# Patient Record
Sex: FEMALE | Race: BLACK OR AFRICAN AMERICAN | NOT HISPANIC OR LATINO | Employment: FULL TIME | URBAN - METROPOLITAN AREA
[De-identification: names, ages, dates, MRNs, and addresses within clinical notes are randomized per-mention and may not be internally consistent; named-entity substitution may affect disease eponyms.]

---

## 2023-07-09 ENCOUNTER — HOSPITAL ENCOUNTER (EMERGENCY)
Facility: HOSPITAL | Age: 53
Discharge: HOME | End: 2023-07-09
Attending: EMERGENCY MEDICINE
Payer: COMMERCIAL

## 2023-07-09 VITALS
HEART RATE: 79 BPM | RESPIRATION RATE: 16 BRPM | WEIGHT: 201 LBS | TEMPERATURE: 97 F | SYSTOLIC BLOOD PRESSURE: 133 MMHG | OXYGEN SATURATION: 98 % | DIASTOLIC BLOOD PRESSURE: 90 MMHG | HEIGHT: 65 IN | BODY MASS INDEX: 33.49 KG/M2

## 2023-07-09 DIAGNOSIS — S70.01XA CONTUSION OF RIGHT HIP, INITIAL ENCOUNTER: ICD-10-CM
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
## 2023-07-09 DIAGNOSIS — S83.91XA SPRAIN OF RIGHT KNEE, UNSPECIFIED LIGAMENT, INITIAL ENCOUNTER: Primary | ICD-10-CM

## 2023-07-09 PROCEDURE — 25000003 PHARM REV CODE 250: Performed by: PHYSICIAN ASSISTANT

## 2023-07-09 PROCEDURE — 99284 EMERGENCY DEPT VISIT MOD MDM: CPT

## 2023-07-09 RX ORDER — HYDROCODONE BITARTRATE AND ACETAMINOPHEN 7.5; 325 MG/1; MG/1
1 TABLET ORAL
Status: COMPLETED | OUTPATIENT
Start: 2023-07-09 | End: 2023-07-09

## 2023-07-09 RX ORDER — HYDROCODONE BITARTRATE AND ACETAMINOPHEN 5; 325 MG/1; MG/1
1 TABLET ORAL EVERY 8 HOURS PRN
Qty: 12 TABLET | Refills: 0 | Status: SHIPPED | OUTPATIENT
Start: 2023-07-09 | End: 2023-07-25

## 2023-07-09 RX ADMIN — HYDROCODONE BITARTRATE AND ACETAMINOPHEN 1 TABLET: 7.5; 325 TABLET ORAL at 02:07

## 2023-07-09 NOTE — ED PROVIDER NOTES
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 fell on right side, after trying to get up felt right knee buckle. Denies blood thinner/hitting head/ LOC. Unable to walk due to pain in right knee. AAOx4, GCS15     52 y.o. female presents to the ED with right hip/knee/lower leg pain s/t fall onset 1 hour ago. States she was stepping down from the back of a [employer] (approx 1-2 foot off the ground) when she lost her footing and fell on her right side. Denies hitting her head or LOC, neck pain, chest pain, abdominal pain, SOB, bowel or bladder incontinence, saddle paresthesia, numbness, tingling, weakness. States she tried walking earlier however notes pain in the knee caused it to buckle and she has not tried walking since. Did not take any medications at home    The history is provided by the patient. No  was used.   Review of patient's allergies indicates:  No Known Allergies  No past medical history on file.  No past surgical history on file.  No family history on file.     Review of Systems   Constitutional:  Negative for chills and fever.   Eyes:  Negative for visual disturbance.   Respiratory:  Negative for cough and shortness of breath.    Cardiovascular:  Negative for chest pain.   Gastrointestinal:  Negative for abdominal pain, nausea and vomiting.   Genitourinary:  Negative for dysuria.   Musculoskeletal:  Positive for arthralgias and myalgias. Negative for back pain and neck pain.   Skin:  Negative for color change and rash.   Neurological:  Negative for dizziness and headaches.   Psychiatric/Behavioral:  Negative for behavioral problems.    All other systems reviewed and are negative.    Physical Exam     Initial Vitals [07/09/23 1327]   BP Pulse Resp Temp SpO2   (!) 146/101 78 18 97 °F (36.1 °C) 99 %      MAP       --          Physical Exam    Nursing note and vitals reviewed.  Constitutional: She appears well-developed and well-nourished.   HENT:   Head: Normocephalic and atraumatic.   Eyes: EOM are normal. Pupils are equal, round, and reactive to light.   Neck: Trachea normal. Neck supple.    Full passive range of motion without pain.     Cardiovascular:  Normal rate, regular rhythm, normal heart sounds and intact distal pulses.           Pulmonary/Chest: Breath sounds normal. She exhibits no tenderness.   No ecchymosis to chest wall   Abdominal: Abdomen is soft. Bowel sounds are normal. She exhibits no distension. There is no abdominal tenderness.   No ecchymosis to abdominal wall There is no rebound.   Musculoskeletal:      Right shoulder: Tenderness (Mild) present. No swelling, deformity, effusion, laceration, bony tenderness or crepitus. Decreased range of motion. Normal strength. Normal pulse.      Cervical back: Normal, full passive range of motion without pain and neck supple.      Thoracic back: Normal.      Lumbar back: Normal.      Right hip: Tenderness present. No deformity, lacerations, bony tenderness or crepitus. Normal range of motion. Normal strength.      Right knee: No swelling, deformity, effusion, erythema, ecchymosis, lacerations or bony tenderness. Decreased range of motion (due to pain). Tenderness present over the patellar tendon. Normal pulse.        Legs:      Neurological: She is alert and oriented to person, place, and time. She has normal strength. GCS score is 15. GCS eye subscore is 4. GCS verbal subscore is 5. GCS motor subscore is 6.   Skin: Skin is warm and dry. Capillary refill takes less than 2 seconds.   Psychiatric: She has a normal mood and affect.       ED Course   Procedures  Labs Reviewed - No data to display       Imaging Results              XR Ribs Min 3 Views w/PA Chest Right (Final result)  Result time 07/09/23 14:48:29      Final result by Butch Membreno MD (07/09/23 14:48:29)                    Impression:      No acute cardiopulmonary process.  Nose displaced rib fracture on the right.      Electronically signed by: Butch Membreno MD  Date:    07/09/2023  Time:    14:48               Narrative:    EXAMINATION:  XR RIBS MIN 3 VIEWS W/ PA CHEST RIGHT    CLINICAL HISTORY:  fall;    TECHNIQUE:  As above    COMPARISON:  None    FINDINGS:  Cardiomediastinal silhouette and pulmonary vasculature are normal.    The lungs and pleural spaces are clear.  No evidence for pneumothorax.    No displaced rib fractures on the right                                       X-Ray Tibia Fibula 2 View Right (Final result)  Result time 07/09/23 14:47:36      Final result by Butch Membreno MD (07/09/23 14:47:36)                   Impression:      No fracture of the tibia or fibula.      Electronically signed by: Butch Membreno MD  Date:    07/09/2023  Time:    14:47               Narrative:    EXAMINATION:  XR TIBIA FIBULA 2 VIEW RIGHT    CLINICAL HISTORY:  Unspecified fall, initial encounter    TECHNIQUE:  AP and lateral views of the right tibia and fibula were performed.    COMPARISON:  None.    FINDINGS:  No fracture of the tibia or fibula.  No significant soft tissue swelling or joint effusion                                       X-Ray Knee Complete 4 Or More Views Right (Final result)  Result time 07/09/23 14:47:11      Final result by Butch Membreno MD (07/09/23 14:47:11)                   Impression:      No fracture of the right knee with minimal degenerative changes.      Electronically signed by: Butch Membreno MD  Date:    07/09/2023  Time:    14:47               Narrative:    EXAMINATION:  XR KNEE COMP 4 OR MORE VIEWS RIGHT    CLINICAL HISTORY:  Unspecified fall, initial encounter    TECHNIQUE:  As above    COMPARISON:  None    FINDINGS:  No fracture.  The alignment is normal.  Joint space narrowing is identified greatest in the medial compartment.  Spurring the tibial spines identified.  No evidence for joint effusion or  soft tissue swelling.                                       X-Ray Hip 2 or 3 views Right (with Pelvis when performed) (Final result)  Result time 07/09/23 14:46:43      Final result by Butch Membreno MD (07/09/23 14:46:43)                   Impression:      No fracture the pelvis or proximal femora.  Degenerative changes are identified      Electronically signed by: Butch Membreno MD  Date:    07/09/2023  Time:    14:46               Narrative:    EXAMINATION:  XR HIP WITH PELVIS WHEN PERFORMED, 2 OR 3  VIEWS RIGHT    CLINICAL HISTORY:  fall;    TECHNIQUE:  AP view of the pelvis and frog leg lateral view of the right hip were performed.    COMPARISON:  None    FINDINGS:  No fracture the pelvis or proximal femora.  The femoral heads well as the acetabulum with joint space narrowing and marginal joint osteophytes.    The bilateral SI joints and pubic symphysis are normal.                                       Medications   HYDROcodone-acetaminophen 7.5-325 mg per tablet 1 tablet (1 tablet Oral Given 7/9/23 1411)     Medical Decision Making:   Initial Assessment:   52 y.o. female presents to the ED with right hip/knee/lower leg pain s/t fall onset 1 hour ago. States she was stepping down from the back of a [employer] (approx 1-2 foot off the ground) when she lost her footing and fell on her right side. Denies hitting her head or LOC, neck pain, chest pain, abdominal pain, SOB, bowel or bladder incontinence, saddle paresthesia, numbness, tingling, weakness. States she tried walking earlier however notes pain in the knee caused it to buckle and she has not tried walking since. Did not take any medications at home  Differential Diagnosis:   Fall, contusion, fracture, dislocation, sprain  Clinical Tests:   Radiological Study: Reviewed and Ordered           ED Course as of 07/09/23 1536   Sun Jul 09, 2023   1523 States she is feeling better after oral pain medication.  Discussed imaging studies with patient.  Will discharge patient  home with crutches and an Ace wrap to help with symptom relief as well as a short course of narcotic pain medication.  Patient states she moved here in April and has not established with a PCP at so will send referral for her.  Encouraged patient to follow up with the PCP once she was establish in order to rule out ligamentous or meniscal injury if the symptoms persist. [MA]      ED Course User Index  [MA] Larry Yeung PA-C                 Clinical Impression:   Final diagnoses:  [W19.XXXA] Fall  [S83.91XA] Sprain of right knee, unspecified ligament, initial encounter (Primary)  [S70.01XA] Contusion of right hip, initial encounter        ED Disposition Condition    Discharge Stable          ED Prescriptions       Medication Sig Dispense Start Date End Date Auth. Provider    HYDROcodone-acetaminophen (NORCO) 5-325 mg per tablet Take 1 tablet by mouth every 8 (eight) hours as needed for Pain. 12 tablet 7/9/2023 7/13/2023 Larry Yeung PA-C          Follow-up Information       Follow up With Specialties Details Why Contact Info    [facility] - Emergency Dept Emergency Medicine In 1 week If symptoms worsen [address] [place] [id number] [phone]    Primary Care Provider  Call  Call the number above and they will get you scheduled with a primary care provider within 48 hours for an appointment [phone]    [facility]   Referral has been sent on your behalf; they will call to schedule follow-up appointment [phone]             Larry Yeung PA-C  07/09/23 2236

## 2023-07-17 ENCOUNTER — OFFICE VISIT (OUTPATIENT)
Dept: ORTHOPEDICS | Facility: CLINIC | Age: 53
End: 2023-07-17
Payer: COMMERCIAL

## 2023-07-17 VITALS — BODY MASS INDEX: 33.49 KG/M2 | HEIGHT: 65 IN | WEIGHT: 201 LBS

## 2023-07-17 DIAGNOSIS — S83.91XA SPRAIN OF RIGHT KNEE, UNSPECIFIED LIGAMENT, INITIAL ENCOUNTER: ICD-10-CM

## 2023-07-17 PROCEDURE — 99204 PR OFFICE/OUTPT VISIT, NEW, LEVL IV, 45-59 MIN: ICD-10-PCS | Mod: ,,, | Performed by: REHABILITATION UNIT

## 2023-07-17 PROCEDURE — 1159F MED LIST DOCD IN RCRD: CPT | Mod: CPTII,,, | Performed by: REHABILITATION UNIT

## 2023-07-17 PROCEDURE — 1159F PR MEDICATION LIST DOCUMENTED IN MEDICAL RECORD: ICD-10-PCS | Mod: CPTII,,, | Performed by: REHABILITATION UNIT

## 2023-07-17 PROCEDURE — 99204 OFFICE O/P NEW MOD 45 MIN: CPT | Mod: ,,, | Performed by: REHABILITATION UNIT

## 2023-07-17 PROCEDURE — 3008F BODY MASS INDEX DOCD: CPT | Mod: CPTII,,, | Performed by: REHABILITATION UNIT

## 2023-07-17 PROCEDURE — 3008F PR BODY MASS INDEX (BMI) DOCUMENTED: ICD-10-PCS | Mod: CPTII,,, | Performed by: REHABILITATION UNIT

## 2023-07-17 RX ORDER — LEVETIRACETAM 500 MG/1
500 TABLET ORAL 2 TIMES DAILY
COMMUNITY
Start: 2023-07-09

## 2023-07-17 RX ORDER — HYDROCODONE BITARTRATE AND ACETAMINOPHEN 10; 325 MG/1; MG/1
1 TABLET ORAL
COMMUNITY
End: 2023-07-25

## 2023-07-17 RX ORDER — DICLOFENAC SODIUM 75 MG/1
75 TABLET, DELAYED RELEASE ORAL 2 TIMES DAILY
Qty: 28 TABLET | Refills: 0 | Status: SHIPPED | OUTPATIENT
Start: 2023-07-17 | End: 2023-07-31

## 2023-07-17 NOTE — PROGRESS NOTES
"Subjective:      Patient ID: Zandra Beck is a 52 y.o. female.    Chief Complaint: Knee Pain (pt fell off [employer] truck and injured right knee. DOI 7/9/23. went to ED after and has XR in chart. pt states the knee has been hurting since the fall. states the emergency room told her the knee may have torn ligament.)    HPI:   Zandra Beck is a 52 y.o. female who presents today for initial evaluation of right knee.  She was stepping out from a moving truck footing and fell landing on her right side.  The injury occurred on 07/09/2023.  She reports instability following the fall and inability to weightbear.  She went to the ED.  She had radiographs obtained with no acute bony abnormality.  She was referred for orthopedic evaluation.  She denies any sensory or motor changes distally.  She does have some instability reported about her knee.  No mechanical symptoms.  She has taken her prescription of Norco 10.  Ambulating without device.    History reviewed. No pertinent past medical history.  Past Surgical History:   Procedure Laterality Date    MYRINGOTOMY W/ TUBES       Social History     Socioeconomic History    Marital status: Single   Tobacco Use    Smoking status: Never    Smokeless tobacco: Never   Substance and Sexual Activity    Alcohol use: Yes     Comment: soc    Drug use: Never         Current Outpatient Medications:     HYDROcodone-acetaminophen (NORCO)  mg per tablet, Take 1 tablet by mouth. 12 total, Disp: , Rfl:     levETIRAcetam (KEPPRA) 500 MG Tab, Take 500 mg by mouth 2 (two) times daily., Disp: , Rfl:   Review of patient's allergies indicates:  No Known Allergies    Ht 5' 5" (1.651 m)   Wt 91.2 kg (201 lb)   BMI 33.45 kg/m²     Comprehensive review of systems completed and negative except as per HPI.        Objective:   Head: Normocephalic, without obvious abnormality, atraumatic  Eyes: conjunctivae/corneas clear. EOM's intact  Ears: normal external appearance  Nose: Nares normal. Septum " midline. Mucosa normal. No drainage  Throat: normal findings: lips normal without lesions  Lungs: unlabored breathing on room air  Chest wall: symmetric chest rise  Heart: regular rate and rhythm  Pulses: 2+ and symmetric  Skin: Skin color, texture, turgor normal. No rashes or lesions  Neurologic: Grossly normal    right KNEE:     Alignment: neutral  Appearance: skin in intact without lesion  Effusion:  Small  Gait antalgic  Straight Leg Raise: negative  Log Roll: negative  Hip ROM: full and painless  Ankle ROM: full and painless   Knee ROM:  3 - 95  Tenderness:  Diffuse TTP  with maximal tenderness to the medial joint line and some to the lateral joint line  Patellar Mobility: normal  Patellar grind: negative  PF Crepitus: negative        Ramiro Test: + to pain  Valgus Stress @ 0 deg: stable  Valgus Stress @ 30 deg: stable  Varus Stress @ 0 deg: stable  Varus Stress @ 30 deg: stable  Lachman:  Guarding  Ant Drawer: negative   Post Drawer: negative  Posterior Sag Sign: negative  Neurological deficits: SILT dp/sp/t distributions  Motor: 5/5 EHL/FHL/TA/GS    Warm well perfused lower extremity with capillary refill less than 2 seconds and sensation intact to light touch in the terminal nerve distributions. Calf soft and easily compressible without clinical sign of DVT. No palpable popliteal lymphadenopathy    Assessment:     Imaging:   Narrative & Impression  EXAMINATION:  XR KNEE COMP 4 OR MORE VIEWS RIGHT     CLINICAL HISTORY:  Unspecified fall, initial encounter     TECHNIQUE:  As above     COMPARISON:  None     FINDINGS:  No fracture.  The alignment is normal.  Joint space narrowing is identified greatest in the medial compartment.  Spurring the tibial spines identified.  No evidence for joint effusion or soft tissue swelling.     Impression:     No fracture of the right knee with minimal degenerative changes.        Electronically signed by: Butch Membreno MD  Date:                                             07/09/2023  Time:                                           14:47    Narrative & Impression  EXAMINATION:  XR TIBIA FIBULA 2 VIEW RIGHT     CLINICAL HISTORY:  Unspecified fall, initial encounter     TECHNIQUE:  AP and lateral views of the right tibia and fibula were performed.     COMPARISON:  None.     FINDINGS:  No fracture of the tibia or fibula.  No significant soft tissue swelling or joint effusion     Impression:     No fracture of the tibia or fibula.        Electronically signed by: Butch Membreno MD  Date:                                            07/09/2023  Time:                                           14:47      Radiographs as above reviewed showing no acute bony abnormality.  Joint spaces are maintained.        1. Sprain of right knee, unspecified ligament, initial encounter          Plan:       Orders Placed This Encounter    MRI Knee Without Contrast Right     Imaging and exam findings discussed.  She sustained an acute traumatic injury to her right knee.  No bony abnormality on radiographs.  There was concern for intra-articular injury such as ligamentous injury versus cartilage/meniscus.  We will proceed with MRI evaluation.  Prescription for diclofenac was provided.  Continue ice and rest.  I will see her back after the MRI is completed to discuss results and treatment plan.  All of her questions were answered. She was happy and in agreement with the plan.

## 2023-07-25 ENCOUNTER — OFFICE VISIT (OUTPATIENT)
Dept: FAMILY MEDICINE | Facility: CLINIC | Age: 53
End: 2023-07-25
Payer: COMMERCIAL

## 2023-07-25 ENCOUNTER — OFFICE VISIT (OUTPATIENT)
Dept: ORTHOPEDICS | Facility: CLINIC | Age: 53
End: 2023-07-25
Payer: COMMERCIAL

## 2023-07-25 VITALS
DIASTOLIC BLOOD PRESSURE: 91 MMHG | SYSTOLIC BLOOD PRESSURE: 157 MMHG | BODY MASS INDEX: 33.49 KG/M2 | HEIGHT: 65 IN | HEART RATE: 86 BPM | WEIGHT: 201 LBS

## 2023-07-25 VITALS
HEART RATE: 78 BPM | SYSTOLIC BLOOD PRESSURE: 130 MMHG | OXYGEN SATURATION: 96 % | HEIGHT: 65 IN | DIASTOLIC BLOOD PRESSURE: 82 MMHG | TEMPERATURE: 98 F | BODY MASS INDEX: 34.76 KG/M2 | WEIGHT: 208.63 LBS | RESPIRATION RATE: 18 BRPM

## 2023-07-25 DIAGNOSIS — Z12.31 ENCOUNTER FOR SCREENING MAMMOGRAM FOR BREAST CANCER: ICD-10-CM

## 2023-07-25 DIAGNOSIS — I10 PRIMARY HYPERTENSION: Primary | ICD-10-CM

## 2023-07-25 DIAGNOSIS — Z12.11 COLON CANCER SCREENING: ICD-10-CM

## 2023-07-25 DIAGNOSIS — S83.411A SPRAIN OF MEDIAL COLLATERAL LIGAMENT OF RIGHT KNEE, INITIAL ENCOUNTER: ICD-10-CM

## 2023-07-25 DIAGNOSIS — Z13.1 SCREENING FOR DIABETES MELLITUS: ICD-10-CM

## 2023-07-25 DIAGNOSIS — Z11.59 NEED FOR HEPATITIS C SCREENING TEST: ICD-10-CM

## 2023-07-25 DIAGNOSIS — Z13.6 ENCOUNTER FOR LIPID SCREENING FOR CARDIOVASCULAR DISEASE: ICD-10-CM

## 2023-07-25 DIAGNOSIS — S83.511D RUPTURE OF ANTERIOR CRUCIATE LIGAMENT OF RIGHT KNEE, SUBSEQUENT ENCOUNTER: Primary | ICD-10-CM

## 2023-07-25 DIAGNOSIS — Z11.4 ENCOUNTER FOR SCREENING FOR HIV: ICD-10-CM

## 2023-07-25 DIAGNOSIS — G40.909 SEIZURE DISORDER: ICD-10-CM

## 2023-07-25 DIAGNOSIS — Z13.220 ENCOUNTER FOR LIPID SCREENING FOR CARDIOVASCULAR DISEASE: ICD-10-CM

## 2023-07-25 PROBLEM — R56.9 SEIZURE: Status: ACTIVE | Noted: 2021-03-02

## 2023-07-25 PROBLEM — G43.909 MIGRAINE: Status: ACTIVE | Noted: 2021-06-28

## 2023-07-25 PROCEDURE — 1160F RVW MEDS BY RX/DR IN RCRD: CPT | Mod: CPTII,,, | Performed by: FAMILY MEDICINE

## 2023-07-25 PROCEDURE — 3079F PR MOST RECENT DIASTOLIC BLOOD PRESSURE 80-89 MM HG: ICD-10-PCS | Mod: CPTII,,, | Performed by: FAMILY MEDICINE

## 2023-07-25 PROCEDURE — 3080F PR MOST RECENT DIASTOLIC BLOOD PRESSURE >= 90 MM HG: ICD-10-PCS | Mod: CPTII,,, | Performed by: REHABILITATION UNIT

## 2023-07-25 PROCEDURE — 3008F BODY MASS INDEX DOCD: CPT | Mod: CPTII,,, | Performed by: FAMILY MEDICINE

## 2023-07-25 PROCEDURE — 99204 PR OFFICE/OUTPT VISIT, NEW, LEVL IV, 45-59 MIN: ICD-10-PCS | Mod: ,,, | Performed by: FAMILY MEDICINE

## 2023-07-25 PROCEDURE — 3079F DIAST BP 80-89 MM HG: CPT | Mod: CPTII,,, | Performed by: FAMILY MEDICINE

## 2023-07-25 PROCEDURE — 3077F SYST BP >= 140 MM HG: CPT | Mod: CPTII,,, | Performed by: REHABILITATION UNIT

## 2023-07-25 PROCEDURE — 1159F PR MEDICATION LIST DOCUMENTED IN MEDICAL RECORD: ICD-10-PCS | Mod: CPTII,,, | Performed by: FAMILY MEDICINE

## 2023-07-25 PROCEDURE — 3075F PR MOST RECENT SYSTOLIC BLOOD PRESS GE 130-139MM HG: ICD-10-PCS | Mod: CPTII,,, | Performed by: FAMILY MEDICINE

## 2023-07-25 PROCEDURE — 3075F SYST BP GE 130 - 139MM HG: CPT | Mod: CPTII,,, | Performed by: FAMILY MEDICINE

## 2023-07-25 PROCEDURE — 3008F PR BODY MASS INDEX (BMI) DOCUMENTED: ICD-10-PCS | Mod: CPTII,,, | Performed by: FAMILY MEDICINE

## 2023-07-25 PROCEDURE — 3080F DIAST BP >= 90 MM HG: CPT | Mod: CPTII,,, | Performed by: REHABILITATION UNIT

## 2023-07-25 PROCEDURE — 99213 OFFICE O/P EST LOW 20 MIN: CPT | Mod: ,,, | Performed by: REHABILITATION UNIT

## 2023-07-25 PROCEDURE — 1159F MED LIST DOCD IN RCRD: CPT | Mod: CPTII,,, | Performed by: REHABILITATION UNIT

## 2023-07-25 PROCEDURE — 3077F PR MOST RECENT SYSTOLIC BLOOD PRESSURE >= 140 MM HG: ICD-10-PCS | Mod: CPTII,,, | Performed by: REHABILITATION UNIT

## 2023-07-25 PROCEDURE — 1160F PR REVIEW ALL MEDS BY PRESCRIBER/CLIN PHARMACIST DOCUMENTED: ICD-10-PCS | Mod: CPTII,,, | Performed by: FAMILY MEDICINE

## 2023-07-25 PROCEDURE — 3008F BODY MASS INDEX DOCD: CPT | Mod: CPTII,,, | Performed by: REHABILITATION UNIT

## 2023-07-25 PROCEDURE — 3008F PR BODY MASS INDEX (BMI) DOCUMENTED: ICD-10-PCS | Mod: CPTII,,, | Performed by: REHABILITATION UNIT

## 2023-07-25 PROCEDURE — 1159F MED LIST DOCD IN RCRD: CPT | Mod: CPTII,,, | Performed by: FAMILY MEDICINE

## 2023-07-25 PROCEDURE — 99213 PR OFFICE/OUTPT VISIT, EST, LEVL III, 20-29 MIN: ICD-10-PCS | Mod: ,,, | Performed by: REHABILITATION UNIT

## 2023-07-25 PROCEDURE — 99204 OFFICE O/P NEW MOD 45 MIN: CPT | Mod: ,,, | Performed by: FAMILY MEDICINE

## 2023-07-25 PROCEDURE — 1159F PR MEDICATION LIST DOCUMENTED IN MEDICAL RECORD: ICD-10-PCS | Mod: CPTII,,, | Performed by: REHABILITATION UNIT

## 2023-07-25 RX ORDER — AMLODIPINE BESYLATE 10 MG/1
10 TABLET ORAL DAILY
Qty: 30 TABLET | Refills: 1 | Status: SHIPPED | OUTPATIENT
Start: 2023-07-25 | End: 2024-07-24

## 2023-07-25 NOTE — PROGRESS NOTES
"Zandra Beck  07/25/2023  [id number]    ELOISE BILLY MD  Patient Care Team:  Eloise Billy MD as PCP - General (Family Medicine)      Chief Complaint:  Chief Complaint   Patient presents with    Establish Care     Needs PCP       History of Present Illness:  HPI    52 y.o. female who presents today to establish care. She has seizure disorder and migraine headaches. Patient needs referral to a neurologist for seizures. She also has elevated bp readings in clinic and at home. Has never been treated but htn does run in her family. She is currently asymptomatic.     Review of Systems  General: denies f/c, weight loss, night sweats, decreased appetite  Eye: denies blurred vision, changes in vision  Respiratory: denies sob, wheezing, cough  Cardiovascular: denies chest pain, palpitations, edema  Gastrointestinal: denies abdominal pain, n/v, constipation, diarrhea  Integumentary: denies rashes, pruritis        Health Maintenance  Health Maintenance Topics with due status: Not Due       Topic Last Completion Date    TETANUS VACCINE 10/11/2016    Influenza Vaccine 10/21/2020     Health Maintenance Due   Topic Date Due    Hepatitis C Screening  Never done    Cervical Cancer Screening  Never done    Lipid Panel  Never done    COVID-19 Vaccine (1) Never done    HIV Screening  Never done    Mammogram  Never done    Hemoglobin A1c (Diabetic Prevention Screening)  Never done    Colorectal Cancer Screening  Never done    Shingles Vaccine (1 of 2) Never done       Exam:  Vitals:    07/25/23 1344   BP: (!) 148/96   BP Location: Right arm   Patient Position: Sitting   BP Method: Large (Manual)   Pulse: 78   Resp: 18   Temp: 98.1 °F (36.7 °C)   TempSrc: Oral   SpO2: 96%   Weight: 94.6 kg (208 lb 9.6 oz)   Height: 5' 5" (1.651 m)     Weight: 94.6 kg (208 lb 9.6 oz)   Body mass index is 34.71 kg/m².      Physical Exam  Constitutional: NAD, alert, pleasant  Respiratory: CTAB, no wheezes, rales or rhonchi. No accessory " muscle use  Eyes: EOMI  Cardiovascular: RRR, No m/r/g. No JVD. No LE edema  Integumentary: warm, dry, intact  Psych: AA&Ox3      ICD-10-CM ICD-9-CM   1. Primary hypertension  I10 401.9   2. Seizure disorder  G40.909 345.90   3. Encounter for screening mammogram for breast cancer  Z12.31 V76.12   4. Encounter for screening for HIV  Z11.4 V73.89   5. Need for hepatitis C screening test  Z11.59 V73.89   6. Encounter for lipid screening for cardiovascular disease  Z13.220 V77.91    Z13.6 V81.2   7. Screening for diabetes mellitus  Z13.1 V77.1   8. Colon cancer screening  Z12.11 V76.51       1. Primary hypertension  Assessment & Plan:  Start norvasc 10 mg daily. Side effects discussed  continue current meds, low salt diet, weight loss and exercise  Avoid drinking too much caffeine  Call me with pressure more than 140/90  ekg and labs ordered    Orders:  -     amLODIPine (NORVASC) 10 MG tablet; Take 1 tablet (10 mg total) by mouth once daily.  Dispense: 30 tablet; Refill: 1  -     EKG 12-lead; Future; Expected date: 07/25/2023  -     CBC Auto Differential; Future; Expected date: 07/25/2023  -     Comprehensive Metabolic Panel; Future; Expected date: 07/25/2023  -     TSH; Future; Expected date: 07/25/2023  -     Urinalysis; Future; Expected date: 07/25/2023    2. Seizure disorder  Overview:  Had two seizures in 2020 of unknown etiology. Compliant with keppra. No seizures since starting keppra. Would like to see a neurologist at this time.    Assessment & Plan:  Refill keppra  Refer to neurology    Orders:  -     Ambulatory referral/consult to Neurology; Future; Expected date: 07/25/2023    3. Encounter for screening mammogram for breast cancer  -     Mammo Digital Screening Bilat w/ Adam; Future; Expected date: 07/25/2023    4. Encounter for screening for HIV  -     HIV 1/2 Ag/Ab (4th Gen); Future; Expected date: 07/25/2023    5. Need for hepatitis C screening test  -     Hepatitis C Antibody; Future; Expected date:  07/25/2023    6. Encounter for lipid screening for cardiovascular disease  -     Lipid Panel; Future; Expected date: 07/25/2023    7. Screening for diabetes mellitus  -     Hemoglobin A1C; Future; Expected date: 07/25/2023    8. Colon cancer screening  Comments:  referre for scope  Orders:  -     Ambulatory referral/consult to Gastroenterology; Future; Expected date: 07/25/2023         Follow up: Follow up for 2 wks wellness with papsmear and labs.      Care Plan/Goals: Reviewed   Goals    None

## 2023-07-25 NOTE — PROGRESS NOTES
"Subjective:      Patient ID: Zandra Beck is a 52 y.o. female.    Chief Complaint: Results (MRI results of R knee. patient states no difference since her last visit. )    HPI:   Zandra Beck is a 52 y.o. female who presents today for follow up evaluation of her right knee.  She had an MRI completed in the interim.  She reports continued pain and reduced motion.  She has some instability.  She has been taking anti-inflammatories which have been helpful.  No sensory or motor changes distally.     Initial HPI:  She was stepping out from a moving truck footing and fell landing on her right side.  The injury occurred on 07/09/2023.  She reports instability following the fall and inability to weightbear.  She went to the ED.  She had radiographs obtained with no acute bony abnormality.  She was referred for orthopedic evaluation.  She denies any sensory or motor changes distally.  She does have some instability reported about her knee.  No mechanical symptoms.  She has taken her prescription of Norco 10.  Ambulating without device.    History reviewed. No pertinent past medical history.  Past Surgical History:   Procedure Laterality Date    MYRINGOTOMY W/ TUBES       Social History     Socioeconomic History    Marital status: Single   Tobacco Use    Smoking status: Never    Smokeless tobacco: Never   Substance and Sexual Activity    Alcohol use: Yes     Comment: soc    Drug use: Never         Current Outpatient Medications:     diclofenac (VOLTAREN) 75 MG EC tablet, Take 1 tablet (75 mg total) by mouth 2 (two) times daily. for 14 days, Disp: 28 tablet, Rfl: 0    levETIRAcetam (KEPPRA) 500 MG Tab, Take 500 mg by mouth 2 (two) times daily., Disp: , Rfl:     HYDROcodone-acetaminophen (NORCO)  mg per tablet, Take 1 tablet by mouth. 12 total, Disp: , Rfl:   Review of patient's allergies indicates:  No Known Allergies    BP (!) 157/91   Pulse 86   Ht 5' 5" (1.651 m)   Wt 91.2 kg (201 lb)   BMI 33.45 kg/m² "     Comprehensive review of systems completed and negative except as per HPI.        Objective:   Head: Normocephalic, without obvious abnormality, atraumatic  Eyes: conjunctivae/corneas clear. EOM's intact  Ears: normal external appearance  Nose: Nares normal. Septum midline. Mucosa normal. No drainage  Throat: normal findings: lips normal without lesions  Lungs: unlabored breathing on room air  Chest wall: symmetric chest rise  Heart: regular rate and rhythm  Pulses: 2+ and symmetric  Skin: Skin color, texture, turgor normal. No rashes or lesions  Neurologic: Grossly normal    right KNEE:     Alignment: neutral  Appearance: skin in intact without lesion  Effusion:  Small  Gait antalgic  Straight Leg Raise: negative  Log Roll: negative  Hip ROM: full and painless  Ankle ROM: full and painless   Knee ROM:  0 - 90  Tenderness:  Diffuse TTP  with maximal tenderness to the medial joint line and some to the lateral joint line  Patellar Mobility: normal  Patellar grind: negative  PF Crepitus: negative        Ramiro Test: + to pain  Valgus Stress @ 0 deg: stable  Valgus Stress @ 30 deg: stable  Varus Stress @ 0 deg: stable  Varus Stress @ 30 deg: stable  Lachman:  Guarding still   Ant Drawer: negative   Post Drawer: negative  Posterior Sag Sign: negative  Neurological deficits: SILT dp/sp/t distributions  Motor: 5/5 EHL/FHL/TA/GS    Warm well perfused lower extremity with capillary refill less than 2 seconds and sensation intact to light touch in the terminal nerve distributions. Calf soft and easily compressible without clinical sign of DVT. No palpable popliteal lymphadenopathy    Assessment:     Imaging:   Narrative & Impression  EXAMINATION:  XR KNEE COMP 4 OR MORE VIEWS RIGHT     CLINICAL HISTORY:  Unspecified fall, initial encounter     TECHNIQUE:  As above     COMPARISON:  None     FINDINGS:  No fracture.  The alignment is normal.  Joint space narrowing is identified greatest in the medial compartment.  Spurring  the tibial spines identified.  No evidence for joint effusion or soft tissue swelling.     Impression:     No fracture of the right knee with minimal degenerative changes.        Electronically signed by: Butch Membreno MD  Date:                                            07/09/2023  Time:                                           14:47    Narrative & Impression  EXAMINATION:  XR TIBIA FIBULA 2 VIEW RIGHT     CLINICAL HISTORY:  Unspecified fall, initial encounter     TECHNIQUE:  AP and lateral views of the right tibia and fibula were performed.     COMPARISON:  None.     FINDINGS:  No fracture of the tibia or fibula.  No significant soft tissue swelling or joint effusion     Impression:     No fracture of the tibia or fibula.        Electronically signed by: Butch Membreno MD  Date:                                            07/09/2023  Time:                                           14:47      Radiographs as above reviewed showing no acute bony abnormality.  Joint spaces are maintained.    MRI Knee Without Contrast Right  Narrative: EXAMINATION:  MRI KNEE WITHOUT CONTRAST RIGHT    CLINICAL HISTORY:  52-year-old woman with right knee pain fall post injury 07/09/2023.    TECHNIQUE:  Axial T2 fat sat, sagittal T2 fat sat, sagittal PD, coronal PD fat sat, coronal T1, and thin slice axial T2 fat-sat meniscal non-contrast 1.5 T magnetic resonance imaging was performed through the right knee per routine protocol    COMPARISON:  None.  Correlation is made with right knee radiographs of 07/09/2023.    FINDINGS:  There is a moderate-size hemarthrosis without popliteal fossa cyst.  There is prominent marrow edema diffusely slow across the posterior medial and lateral tibial plateau and extending along the medial aspect of the medial tibial plateau.  There is a non-displaced fracture at the posterior medial tibial plateau and in additional moderately depressed impaction fracture along the posterior lateral tibial plateau, well  demonstrated on the sagittal imaging.  There is additional moderate patchy marrow edema at the fibular head greater than neck with associated hairline oblique fracture at the fibular head.  The proximal tibiofibular joint remains normal.  There is much more focal and mild edema underlying the lateral femoral sulcus without associated fracture.  The ACL is diffusely edematous and prominently thickened with poor definition, consistent with at least near full-thickness rupture.  There is no tibial translation.  The PCL is normal.  The medial meniscus and lateral meniscus are both intact and normal.  There is moderate edema along the proximal 2/3 of the superficial medial collateral ligament which is mildly thickened with intermediate signal along its proximal 3rd, consistent with grade 2 sprain.  There is also grade 2 sprain along the meniscofemoral and meniscotibial ligaments with focal thin marrow edema at the medial aspect of the medial femoral condyle overlying the body of the medial meniscus.  There is additional more focal edema with thickening and intermediate signal involving the proximal insertional fibular collateral ligament.  The posterolateral corner ligaments are normal.  The medial and lateral tendons are normal.  The quadriceps tendon and patellofemoral retinacular complexes are normal and there is no abnormality at quadriceps fat pad.  The patellar tendon and Hoffa's fat pad are normal.  There is mild diffuse anterior infrapatellar subcutaneous edema without hematoma or bursitis.  There is mild feathery muscle edema diffusely surrounding the proximal fibula consistent with low-grade strain and/or contusion.  No muscle atrophy.  Alignment at the knee is anatomic.  The patellofemoral and femorotibial articular cartilage is normal and there is no significant degenerative change.  Impression: Moderate size hemarthrosis with prominent osseous contusion and impaction fracturing along the posterior proximal  tibia with focal articular surface depression along the posterior lateral tibial plateau.  Additional much more mild osseous contusion underlying the lateral femoral sulcus and the findings are consistent with pivot shift type osseous contusions.    At least near complete ACL rupture without associated anterior tibial translation.  No meniscal tear.    Grade 2 sprain of the superficial and deep MCL and additional more mild grade 2 sprain at the proximal fibular collateral ligament.    Osseous contusion and hairline fracture at the fibular head without posterolateral corner ligament injury.    Low grade 1 sprain and/or contusion involving the muscles surrounding the proximal fibula.    Electronically signed by: Santiago Galan  Date:    07/22/2023  Time:    06:56    MRI reviewed showing ACL rupture.  Grade 2 MCL injury.  Grade 2 LCL injury as well.  Posterolateral corner was intact.  Cartilage is intact.  No meniscus tear.  Extensor mechanism is intact.        1. Rupture of anterior cruciate ligament of right knee, subsequent encounter    2. Sprain of medial collateral ligament of right knee, initial encounter            Plan:       Orders Placed This Encounter    HME - OTHER    Ambulatory referral/consult to Physical/Occupational Therapy     Imaging and exam findings discussed.  She sustained an acute traumatic injury to her right knee.  MRI shows ACL tear with grade 2 MCL and LCL sprains.  No additional intra-articular pathology.  We discussed options at length.  She still has poor motion.  We are going to start with some physical therapy to improve her motion.  She was fit for a knee brace to help provide some stability.  Continue diclofenac and ice.  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  Note provided for work precautions.  I will see her back in 3 weeks for repeat evaluation and motion check.  If she has persistent instability then we will proceed with operative planning. All of her questions were answered. She  was happy and in agreement with the plan.

## 2023-07-25 NOTE — ASSESSMENT & PLAN NOTE
Start norvasc 10 mg daily. Side effects discussed  continue current meds, low salt diet, weight loss and exercise  Avoid drinking too much caffeine  Call me with pressure more than 140/90  ekg and labs ordered

## 2023-07-25 NOTE — LETTER
July 25, 2023    Zandra Beck  [address] [address]          [facility]  [address], [address]  [facility] [address]  Phone: [phone]  Fax: [phone] July 25, 2023     Patient: Zandra Beck   YOB: 1970   Date of Visit: 7/25/2023       To Whom It May Concern:    It is my medical opinion that Zandra Beck may return to light duty immediately with the following restrictions: no heavy lifting or prolonged standing . No pushing or pulling as well.     If you have any questions or concerns, please don't hesitate to call.    Sincerely,      Dat Thrasher MD/MIRNA

## 2023-08-16 ENCOUNTER — PATIENT MESSAGE (OUTPATIENT)
Dept: ADMINISTRATIVE | Facility: HOSPITAL | Age: 53
End: 2023-08-16
Payer: COMMERCIAL

## 2023-08-23 ENCOUNTER — PATIENT OUTREACH (OUTPATIENT)
Dept: ADMINISTRATIVE | Facility: HOSPITAL | Age: 53
End: 2023-08-23
Payer: COMMERCIAL

## 2023-08-23 NOTE — PROGRESS NOTES
Population Health Outreach.  Patient received message from [facility] regarding overdue screenings, she is interested in meeting all overdue topics, call to patient to for more info. No ans. Left message  asking for a call back.  Patient has an appointment with Dr. Oliver 8/31/23

## 2023-08-30 ENCOUNTER — OFFICE VISIT (OUTPATIENT)
Dept: ORTHOPEDICS | Facility: CLINIC | Age: 53
End: 2023-08-30
Payer: COMMERCIAL

## 2023-08-30 VITALS
WEIGHT: 208 LBS | HEIGHT: 65 IN | HEART RATE: 87 BPM | SYSTOLIC BLOOD PRESSURE: 127 MMHG | DIASTOLIC BLOOD PRESSURE: 87 MMHG | BODY MASS INDEX: 34.66 KG/M2

## 2023-08-30 DIAGNOSIS — S83.511D RUPTURE OF ANTERIOR CRUCIATE LIGAMENT OF RIGHT KNEE, SUBSEQUENT ENCOUNTER: Primary | ICD-10-CM

## 2023-08-30 DIAGNOSIS — S83.411D SPRAIN OF MEDIAL COLLATERAL LIGAMENT OF RIGHT KNEE, SUBSEQUENT ENCOUNTER: ICD-10-CM

## 2023-08-30 DIAGNOSIS — S83.91XD SPRAIN OF RIGHT KNEE, UNSPECIFIED LIGAMENT, SUBSEQUENT ENCOUNTER: ICD-10-CM

## 2023-08-30 PROCEDURE — 99213 OFFICE O/P EST LOW 20 MIN: CPT | Mod: ,,, | Performed by: REHABILITATION UNIT

## 2023-08-30 PROCEDURE — 3008F PR BODY MASS INDEX (BMI) DOCUMENTED: ICD-10-PCS | Mod: CPTII,,, | Performed by: REHABILITATION UNIT

## 2023-08-30 PROCEDURE — 3074F SYST BP LT 130 MM HG: CPT | Mod: CPTII,,, | Performed by: REHABILITATION UNIT

## 2023-08-30 PROCEDURE — 3074F PR MOST RECENT SYSTOLIC BLOOD PRESSURE < 130 MM HG: ICD-10-PCS | Mod: CPTII,,, | Performed by: REHABILITATION UNIT

## 2023-08-30 PROCEDURE — 3079F PR MOST RECENT DIASTOLIC BLOOD PRESSURE 80-89 MM HG: ICD-10-PCS | Mod: CPTII,,, | Performed by: REHABILITATION UNIT

## 2023-08-30 PROCEDURE — 3079F DIAST BP 80-89 MM HG: CPT | Mod: CPTII,,, | Performed by: REHABILITATION UNIT

## 2023-08-30 PROCEDURE — 99213 PR OFFICE/OUTPT VISIT, EST, LEVL III, 20-29 MIN: ICD-10-PCS | Mod: ,,, | Performed by: REHABILITATION UNIT

## 2023-08-30 PROCEDURE — 3008F BODY MASS INDEX DOCD: CPT | Mod: CPTII,,, | Performed by: REHABILITATION UNIT

## 2023-08-30 NOTE — PROGRESS NOTES
Subjective:      Patient ID: Zandra Beck is a 52 y.o. female.    Chief Complaint: Follow-up (R knee follow up. states that she has notice some improvement. she has been going to PT x2s a week. unless she makes a wrong move she will get a pain in the knee. she reports in the office with a knee brace that has helped. )    HPI:   Zandra Beck is a 52 y.o. female who presents today for follow up evaluation of her right knee.  She is improving.  She is been attending physical therapy twice per week.  She continues to wear her brace.  She denies any sensory or motor changes.  She denies any gross instability with her day-to-day activities.  She is overall pleased with her progress.    Initial HPI:  She was stepping out from a moving truck footing and fell landing on her right side.  The injury occurred on 07/09/2023.  She reports instability following the fall and inability to weightbear.  She went to the ED.  She had radiographs obtained with no acute bony abnormality.  She was referred for orthopedic evaluation.  She denies any sensory or motor changes distally.  She does have some instability reported about her knee.  No mechanical symptoms.  She has taken her prescription of Norco 10.  Ambulating without device.    Past Medical History:   Diagnosis Date    ACL (anterior cruciate ligament) tear     right knee    Complete tear of MCL of knee, right, initial encounter     Seizure      Past Surgical History:   Procedure Laterality Date    MYRINGOTOMY W/ TUBES      TUBAL LIGATION       Social History     Socioeconomic History    Marital status: Single   Tobacco Use    Smoking status: Never    Smokeless tobacco: Never   Substance and Sexual Activity    Alcohol use: Yes     Comment: soc    Drug use: Never    Sexual activity: Not Currently     Partners: Male     Birth control/protection: Abstinence, None     Social Determinants of Health     Financial Resource Strain: Low Risk  (7/25/2023)    Overall Financial Resource  "Strain (CARDIA)     Difficulty of Paying Living Expenses: Not hard at all   Food Insecurity: No Food Insecurity (7/25/2023)    Hunger Vital Sign     Worried About Running Out of Food in the Last Year: Never true     Ran Out of Food in the Last Year: Never true   Transportation Needs: No Transportation Needs (7/25/2023)    PRAPARE - Transportation     Lack of Transportation (Medical): No     Lack of Transportation (Non-Medical): No   Physical Activity: Insufficiently Active (7/25/2023)    Exercise Vital Sign     Days of Exercise per Week: 3 days     Minutes of Exercise per Session: 30 min   Social Connections: Unknown (7/25/2023)    Social Connection and Isolation Panel [NHANES]     Frequency of Communication with Friends and Family: More than three times a week     Frequency of Social Gatherings with Friends and Family: More than three times a week     Marital Status: Never    Housing Stability: Unknown (7/25/2023)    Housing Stability Vital Sign     Unable to Pay for Housing in the Last Year: No     Unstable Housing in the Last Year: No         Current Outpatient Medications:     amLODIPine (NORVASC) 10 MG tablet, Take 1 tablet (10 mg total) by mouth once daily., Disp: 30 tablet, Rfl: 1    levETIRAcetam (KEPPRA) 500 MG Tab, Take 500 mg by mouth 2 (two) times daily., Disp: , Rfl:     levETIRAcetam (KEPPRA) 500 MG Tab, Take 1 tablet (500 mg total) by mouth 2 (two) times daily., Disp: 180 tablet, Rfl: 3  Review of patient's allergies indicates:  No Known Allergies    /87   Pulse 87   Ht 5' 5" (1.651 m)   Wt 94.3 kg (208 lb)   BMI 34.61 kg/m²     Comprehensive review of systems completed and negative except as per HPI.        Objective:   Head: Normocephalic, without obvious abnormality, atraumatic  Eyes: conjunctivae/corneas clear. EOM's intact  Ears: normal external appearance  Nose: Nares normal. Septum midline. Mucosa normal. No drainage  Throat: normal findings: lips normal without lesions  Lungs: " unlabored breathing on room air  Chest wall: symmetric chest rise  Heart: regular rate and rhythm  Pulses: 2+ and symmetric  Skin: Skin color, texture, turgor normal. No rashes or lesions  Neurologic: Grossly normal    right KNEE:     Alignment: neutral  Appearance: skin in intact without lesion  Effusion:  trace  Gait antalgic  Straight Leg Raise: negative  Log Roll: negative  Hip ROM: full and painless  Ankle ROM: full and painless   Knee ROM:  0 - 115  Tenderness:  Much improved tenderness to the joint lines  Patellar Mobility: normal  Patellar grind: negative  PF Crepitus: negative        Ramiro Test: + to pain improved  Valgus Stress @ 0 deg: stable  Valgus Stress @ 30 deg: stable  Varus Stress @ 0 deg: stable  Varus Stress @ 30 deg: stable  Lachman:  1B  Ant Drawer: negative   Post Drawer: negative  Posterior Sag Sign: negative  Neurological deficits: SILT dp/sp/t distributions  Motor: 5/5 EHL/FHL/TA/GS    Warm well perfused lower extremity with capillary refill less than 2 seconds and sensation intact to light touch in the terminal nerve distributions. Calf soft and easily compressible without clinical sign of DVT. No palpable popliteal lymphadenopathy    Assessment:     Imaging:   Narrative & Impression  EXAMINATION:  XR KNEE COMP 4 OR MORE VIEWS RIGHT     CLINICAL HISTORY:  Unspecified fall, initial encounter     TECHNIQUE:  As above     COMPARISON:  None     FINDINGS:  No fracture.  The alignment is normal.  Joint space narrowing is identified greatest in the medial compartment.  Spurring the tibial spines identified.  No evidence for joint effusion or soft tissue swelling.     Impression:     No fracture of the right knee with minimal degenerative changes.        Electronically signed by: Butch Membreno MD  Date:                                            07/09/2023  Time:                                           14:47    Narrative & Impression  EXAMINATION:  XR TIBIA FIBULA 2 VIEW RIGHT     CLINICAL  HISTORY:  Unspecified fall, initial encounter     TECHNIQUE:  AP and lateral views of the right tibia and fibula were performed.     COMPARISON:  None.     FINDINGS:  No fracture of the tibia or fibula.  No significant soft tissue swelling or joint effusion     Impression:     No fracture of the tibia or fibula.        Electronically signed by: Butch Membreno MD  Date:                                            07/09/2023  Time:                                           14:47      Radiographs as above reviewed showing no acute bony abnormality.  Joint spaces are maintained.    MRI Knee Without Contrast Right  Narrative: EXAMINATION:  MRI KNEE WITHOUT CONTRAST RIGHT    CLINICAL HISTORY:  52-year-old woman with right knee pain fall post injury 07/09/2023.    TECHNIQUE:  Axial T2 fat sat, sagittal T2 fat sat, sagittal PD, coronal PD fat sat, coronal T1, and thin slice axial T2 fat-sat meniscal non-contrast 1.5 T magnetic resonance imaging was performed through the right knee per routine protocol    COMPARISON:  None.  Correlation is made with right knee radiographs of 07/09/2023.    FINDINGS:  There is a moderate-size hemarthrosis without popliteal fossa cyst.  There is prominent marrow edema diffusely slow across the posterior medial and lateral tibial plateau and extending along the medial aspect of the medial tibial plateau.  There is a non-displaced fracture at the posterior medial tibial plateau and in additional moderately depressed impaction fracture along the posterior lateral tibial plateau, well demonstrated on the sagittal imaging.  There is additional moderate patchy marrow edema at the fibular head greater than neck with associated hairline oblique fracture at the fibular head.  The proximal tibiofibular joint remains normal.  There is much more focal and mild edema underlying the lateral femoral sulcus without associated fracture.  The ACL is diffusely edematous and prominently thickened with poor  definition, consistent with at least near full-thickness rupture.  There is no tibial translation.  The PCL is normal.  The medial meniscus and lateral meniscus are both intact and normal.  There is moderate edema along the proximal 2/3 of the superficial medial collateral ligament which is mildly thickened with intermediate signal along its proximal 3rd, consistent with grade 2 sprain.  There is also grade 2 sprain along the meniscofemoral and meniscotibial ligaments with focal thin marrow edema at the medial aspect of the medial femoral condyle overlying the body of the medial meniscus.  There is additional more focal edema with thickening and intermediate signal involving the proximal insertional fibular collateral ligament.  The posterolateral corner ligaments are normal.  The medial and lateral tendons are normal.  The quadriceps tendon and patellofemoral retinacular complexes are normal and there is no abnormality at quadriceps fat pad.  The patellar tendon and Hoffa's fat pad are normal.  There is mild diffuse anterior infrapatellar subcutaneous edema without hematoma or bursitis.  There is mild feathery muscle edema diffusely surrounding the proximal fibula consistent with low-grade strain and/or contusion.  No muscle atrophy.  Alignment at the knee is anatomic.  The patellofemoral and femorotibial articular cartilage is normal and there is no significant degenerative change.  Impression: Moderate size hemarthrosis with prominent osseous contusion and impaction fracturing along the posterior proximal tibia with focal articular surface depression along the posterior lateral tibial plateau.  Additional much more mild osseous contusion underlying the lateral femoral sulcus and the findings are consistent with pivot shift type osseous contusions.    At least near complete ACL rupture without associated anterior tibial translation.  No meniscal tear.    Grade 2 sprain of the superficial and deep MCL and  additional more mild grade 2 sprain at the proximal fibular collateral ligament.    Osseous contusion and hairline fracture at the fibular head without posterolateral corner ligament injury.    Low grade 1 sprain and/or contusion involving the muscles surrounding the proximal fibula.    Electronically signed by: Santiago Galan  Date:    07/22/2023  Time:    06:56    MRI reviewed showing ACL rupture.  Grade 2 MCL injury.  Grade 2 LCL injury as well.  Posterolateral corner was intact.  Cartilage is intact.  No meniscus tear.  Extensor mechanism is intact.        1. Rupture of anterior cruciate ligament of right knee, subsequent encounter    2. Sprain of medial collateral ligament of right knee, subsequent encounter    3. Sprain of right knee, unspecified ligament, subsequent encounter              Plan:            Imaging and exam findings discussed.  She sustained an acute traumatic injury to her right knee.  MRI shows ACL tear with grade 2 MCL and LCL sprains.  No additional intra-articular pathology.  We again discussed options at length.  She is been trying nonoperative measures.  She is been in therapy and seen an improvement in her motion and pain.  Her knee braces providing some stability.  She has been working.  She is overall pleased with her progress at this time.  I will continue to have close follow-up and proceed with surgery for any persistent instability or pain.  She would like to avoid surgery if able.  Follow up in around 6 weeks. All of her questions were answered. She was happy and in agreement with the plan.

## 2023-09-12 ENCOUNTER — OFFICE VISIT (OUTPATIENT)
Dept: NEUROLOGY | Facility: CLINIC | Age: 53
End: 2023-09-12
Payer: COMMERCIAL

## 2023-09-12 VITALS
DIASTOLIC BLOOD PRESSURE: 86 MMHG | BODY MASS INDEX: 33.82 KG/M2 | SYSTOLIC BLOOD PRESSURE: 114 MMHG | WEIGHT: 203 LBS | HEIGHT: 65 IN

## 2023-09-12 DIAGNOSIS — G40.909 SEIZURE DISORDER: ICD-10-CM

## 2023-09-12 PROCEDURE — 1159F PR MEDICATION LIST DOCUMENTED IN MEDICAL RECORD: ICD-10-PCS | Mod: CPTII,S$GLB,, | Performed by: PSYCHIATRY & NEUROLOGY

## 2023-09-12 PROCEDURE — 3074F SYST BP LT 130 MM HG: CPT | Mod: CPTII,S$GLB,, | Performed by: PSYCHIATRY & NEUROLOGY

## 2023-09-12 PROCEDURE — 3074F PR MOST RECENT SYSTOLIC BLOOD PRESSURE < 130 MM HG: ICD-10-PCS | Mod: CPTII,S$GLB,, | Performed by: PSYCHIATRY & NEUROLOGY

## 2023-09-12 PROCEDURE — 1159F MED LIST DOCD IN RCRD: CPT | Mod: CPTII,S$GLB,, | Performed by: PSYCHIATRY & NEUROLOGY

## 2023-09-12 PROCEDURE — 99999 PR PBB SHADOW E&M-EST. PATIENT-LVL III: CPT | Mod: PBBFAC,,, | Performed by: PSYCHIATRY & NEUROLOGY

## 2023-09-12 PROCEDURE — 3079F PR MOST RECENT DIASTOLIC BLOOD PRESSURE 80-89 MM HG: ICD-10-PCS | Mod: CPTII,S$GLB,, | Performed by: PSYCHIATRY & NEUROLOGY

## 2023-09-12 PROCEDURE — 99204 PR OFFICE/OUTPT VISIT, NEW, LEVL IV, 45-59 MIN: ICD-10-PCS | Mod: S$GLB,,, | Performed by: PSYCHIATRY & NEUROLOGY

## 2023-09-12 PROCEDURE — 3079F DIAST BP 80-89 MM HG: CPT | Mod: CPTII,S$GLB,, | Performed by: PSYCHIATRY & NEUROLOGY

## 2023-09-12 PROCEDURE — 3008F BODY MASS INDEX DOCD: CPT | Mod: CPTII,S$GLB,, | Performed by: PSYCHIATRY & NEUROLOGY

## 2023-09-12 PROCEDURE — 3008F PR BODY MASS INDEX (BMI) DOCUMENTED: ICD-10-PCS | Mod: CPTII,S$GLB,, | Performed by: PSYCHIATRY & NEUROLOGY

## 2023-09-12 PROCEDURE — 99204 OFFICE O/P NEW MOD 45 MIN: CPT | Mod: S$GLB,,, | Performed by: PSYCHIATRY & NEUROLOGY

## 2023-09-12 PROCEDURE — 99999 PR PBB SHADOW E&M-EST. PATIENT-LVL III: ICD-10-PCS | Mod: PBBFAC,,, | Performed by: PSYCHIATRY & NEUROLOGY

## 2023-09-12 RX ORDER — LEVETIRACETAM 500 MG/1
500 TABLET ORAL 2 TIMES DAILY
Qty: 180 TABLET | Refills: 3 | Status: SHIPPED | OUTPATIENT
Start: 2023-09-12 | End: 2024-09-11

## 2023-09-12 NOTE — PROGRESS NOTES
Subjective     Patient ID: Zandra Beck is a 52 y.o. female.    Chief Complaint: Establish Care (New Patient: Referred by Dr. Eloise Newsome for seizures) and Seizures    HPI  2 sz total in oct and dec 2021  None since  GTC by descriptoin  Daughter had childhood epilepsy; resolved age 12  Grandson has epilepsy  Works in cafeteria at [employer]  On keppra; no side effects  We discussed r/b/a stopping keppra  Review of Systems  The remainder of the 14 system ROS is noncontributory or negative unless mentioned/reviewed above.       Objective     Physical Exam  Mental Status: Alert and oriented x3. Language is fluent with good comprehension.    Cranial Nerve: Pupils are equal, round, and reactive to light. Visual fields are intact to confrontation. Normal fundi. Ocular movements are intact. Face is symmetric at rest and with activation with intact sensation throughout. Hearing intact to finger rub bilaterally. Muscles of tongue and palate activate symmetrically. No dysarthria. Strength is full in sternocleidomastoid and trapezius bilaterally.    Motor: Muscle bulk and tone are normal. Strength is 5/5 in all four extremities both proximally and distally. Intact fine motor movements bilaterally. There is no pronator drift or satelliting on arm roll.    Sensory: Sensation is intact to light touch, pinprick, vibration, and proprioception throughout. Romberg is negative.    Reflexes: 2+ and symmetric at the biceps, triceps, brachioradialis, patella, and Achilles bilaterally. Plantar response is flexor bilaterally.    Coordination: No dysmetria on finger-nose-finger or heel-knee-shin. Normal rapid alternating movements. Fast finger tapping with normal amplitude and speed.    Gait: Narrow based with normal stride length and good arm swing bilaterally. Able to walk on heels, toes, and in tandem.       Assessment and Plan     1. Seizure disorder  Overview:  Had two seizures in 2020 of unknown etiology. Compliant with keppra.  No seizures since starting keppra. Would like to see a neurologist at this time.    Orders:  -     Ambulatory referral/consult to Neurology    Other orders  -     levETIRAcetam (KEPPRA) 500 MG Tab; Take 1 tablet (500 mg total) by mouth 2 (two) times daily.  Dispense: 180 tablet; Refill: 3        Continue keppra         Follow up in about 1 year (around 9/12/2024).

## 2023-10-09 ENCOUNTER — OFFICE VISIT (OUTPATIENT)
Dept: ORTHOPEDICS | Facility: CLINIC | Age: 53
End: 2023-10-09
Payer: COMMERCIAL

## 2023-10-09 VITALS
HEIGHT: 65 IN | HEART RATE: 79 BPM | SYSTOLIC BLOOD PRESSURE: 152 MMHG | BODY MASS INDEX: 33.78 KG/M2 | DIASTOLIC BLOOD PRESSURE: 87 MMHG

## 2023-10-09 DIAGNOSIS — S83.511D RUPTURE OF ANTERIOR CRUCIATE LIGAMENT OF RIGHT KNEE, SUBSEQUENT ENCOUNTER: Primary | ICD-10-CM

## 2023-10-09 PROCEDURE — 3008F BODY MASS INDEX DOCD: CPT | Mod: CPTII,,, | Performed by: REHABILITATION UNIT

## 2023-10-09 PROCEDURE — 3077F PR MOST RECENT SYSTOLIC BLOOD PRESSURE >= 140 MM HG: ICD-10-PCS | Mod: CPTII,,, | Performed by: REHABILITATION UNIT

## 2023-10-09 PROCEDURE — 3077F SYST BP >= 140 MM HG: CPT | Mod: CPTII,,, | Performed by: REHABILITATION UNIT

## 2023-10-09 PROCEDURE — 3008F PR BODY MASS INDEX (BMI) DOCUMENTED: ICD-10-PCS | Mod: CPTII,,, | Performed by: REHABILITATION UNIT

## 2023-10-09 PROCEDURE — 99213 PR OFFICE/OUTPT VISIT, EST, LEVL III, 20-29 MIN: ICD-10-PCS | Mod: ,,, | Performed by: REHABILITATION UNIT

## 2023-10-09 PROCEDURE — 3079F PR MOST RECENT DIASTOLIC BLOOD PRESSURE 80-89 MM HG: ICD-10-PCS | Mod: CPTII,,, | Performed by: REHABILITATION UNIT

## 2023-10-09 PROCEDURE — 99213 OFFICE O/P EST LOW 20 MIN: CPT | Mod: ,,, | Performed by: REHABILITATION UNIT

## 2023-10-09 PROCEDURE — 1159F PR MEDICATION LIST DOCUMENTED IN MEDICAL RECORD: ICD-10-PCS | Mod: CPTII,,, | Performed by: REHABILITATION UNIT

## 2023-10-09 PROCEDURE — 3079F DIAST BP 80-89 MM HG: CPT | Mod: CPTII,,, | Performed by: REHABILITATION UNIT

## 2023-10-09 PROCEDURE — 1159F MED LIST DOCD IN RCRD: CPT | Mod: CPTII,,, | Performed by: REHABILITATION UNIT

## 2023-10-09 NOTE — PROGRESS NOTES
Subjective:      Patient ID: Zandra Beck is a 52 y.o. female.    Chief Complaint: Knee Pain (Right knee pain is doing better PT is really helping is going 2x a week stopped using her brace about two weeks ago does her therapy from home as well has seen some improvements)    HPI:   Zandra Beck is a 52 y.o. female who presents today for follow up evaluation of her right knee.  She continues to improve and is very pleased with her progress.  She has been in physical therapy and attending twice weekly.  She is out of her brace.  She denies any gross instability or mechanical symptoms.  No sensory or motor changes.  She is tolerating her work and her activities of daily living.  She is overall pleased with her progress.    Initial HPI:  She was stepping out from a moving truck footing and fell landing on her right side.  The injury occurred on 07/09/2023.  She reports instability following the fall and inability to weightbear.  She went to the ED.  She had radiographs obtained with no acute bony abnormality.  She was referred for orthopedic evaluation.  She denies any sensory or motor changes distally.  She does have some instability reported about her knee.  No mechanical symptoms.  She has taken her prescription of Norco 10.  Ambulating without device.    Past Medical History:   Diagnosis Date    ACL (anterior cruciate ligament) tear     right knee    Complete tear of MCL of knee, right, initial encounter     Seizure      Past Surgical History:   Procedure Laterality Date    MYRINGOTOMY W/ TUBES      TUBAL LIGATION       Social History     Socioeconomic History    Marital status: Single   Tobacco Use    Smoking status: Never    Smokeless tobacco: Never   Substance and Sexual Activity    Alcohol use: Yes     Comment: soc    Drug use: Never    Sexual activity: Not Currently     Partners: Male     Birth control/protection: Abstinence, None     Social Determinants of Health     Financial Resource Strain: Low Risk   "(7/25/2023)    Overall Financial Resource Strain (CARDIA)     Difficulty of Paying Living Expenses: Not hard at all   Food Insecurity: No Food Insecurity (7/25/2023)    Hunger Vital Sign     Worried About Running Out of Food in the Last Year: Never true     Ran Out of Food in the Last Year: Never true   Transportation Needs: No Transportation Needs (7/25/2023)    PRAPARE - Transportation     Lack of Transportation (Medical): No     Lack of Transportation (Non-Medical): No   Physical Activity: Insufficiently Active (7/25/2023)    Exercise Vital Sign     Days of Exercise per Week: 3 days     Minutes of Exercise per Session: 30 min   Social Connections: Unknown (7/25/2023)    Social Connection and Isolation Panel [NHANES]     Frequency of Communication with Friends and Family: More than three times a week     Frequency of Social Gatherings with Friends and Family: More than three times a week     Marital Status: Never    Housing Stability: Unknown (7/25/2023)    Housing Stability Vital Sign     Unable to Pay for Housing in the Last Year: No     Unstable Housing in the Last Year: No         Current Outpatient Medications:     amLODIPine (NORVASC) 10 MG tablet, Take 1 tablet (10 mg total) by mouth once daily., Disp: 30 tablet, Rfl: 1    levETIRAcetam (KEPPRA) 500 MG Tab, Take 500 mg by mouth 2 (two) times daily., Disp: , Rfl:     levETIRAcetam (KEPPRA) 500 MG Tab, Take 1 tablet (500 mg total) by mouth 2 (two) times daily., Disp: 180 tablet, Rfl: 3  Review of patient's allergies indicates:  No Known Allergies    BP (!) 152/87 (BP Location: Left forearm, Patient Position: Sitting) Comment: unable to obtain patient was speaking  Pulse 79   Ht 5' 5" (1.651 m)   BMI 33.78 kg/m²     Comprehensive review of systems completed and negative except as per HPI.        Objective:   Head: Normocephalic, without obvious abnormality, atraumatic  Eyes: conjunctivae/corneas clear. EOM's intact  Ears: normal external " appearance  Nose: Nares normal. Septum midline. Mucosa normal. No drainage  Throat: normal findings: lips normal without lesions  Lungs: unlabored breathing on room air  Chest wall: symmetric chest rise  Heart: regular rate and rhythm  Pulses: 2+ and symmetric  Skin: Skin color, texture, turgor normal. No rashes or lesions  Neurologic: Grossly normal    right KNEE:     Alignment: neutral  Appearance: skin in intact without lesion  Effusion:  None appreciable  Gait wnl  Straight Leg Raise: negative  Log Roll: negative  Hip ROM: full and painless  Ankle ROM: full and painless   Knee ROM:  0 - 125  Tenderness:  Much improved no significant tenderness  Patellar Mobility: normal  Patellar grind: negative  PF Crepitus: negative        Ramiro Test: -  Valgus Stress @ 0 deg: stable  Valgus Stress @ 30 deg: stable  Varus Stress @ 0 deg: stable  Varus Stress @ 30 deg: stable  Lachman:  1B  Ant Drawer: negative   Post Drawer: negative  Posterior Sag Sign: negative  Neurological deficits: SILT dp/sp/t distributions  Motor: 5/5 EHL/FHL/TA/GS    Warm well perfused lower extremity with capillary refill less than 2 seconds and sensation intact to light touch in the terminal nerve distributions. Calf soft and easily compressible without clinical sign of DVT. No palpable popliteal lymphadenopathy    Assessment:     Imaging:   Narrative & Impression  EXAMINATION:  XR KNEE COMP 4 OR MORE VIEWS RIGHT     CLINICAL HISTORY:  Unspecified fall, initial encounter     TECHNIQUE:  As above     COMPARISON:  None     FINDINGS:  No fracture.  The alignment is normal.  Joint space narrowing is identified greatest in the medial compartment.  Spurring the tibial spines identified.  No evidence for joint effusion or soft tissue swelling.     Impression:     No fracture of the right knee with minimal degenerative changes.        Electronically signed by: Butch Membreno MD  Date:                                            07/09/2023  Time:                                            14:47    Narrative & Impression  EXAMINATION:  XR TIBIA FIBULA 2 VIEW RIGHT     CLINICAL HISTORY:  Unspecified fall, initial encounter     TECHNIQUE:  AP and lateral views of the right tibia and fibula were performed.     COMPARISON:  None.     FINDINGS:  No fracture of the tibia or fibula.  No significant soft tissue swelling or joint effusion     Impression:     No fracture of the tibia or fibula.        Electronically signed by: Butch Membreno MD  Date:                                            07/09/2023  Time:                                           14:47      Radiographs as above reviewed showing no acute bony abnormality.  Joint spaces are maintained.    MRI Knee Without Contrast Right  Narrative: EXAMINATION:  MRI KNEE WITHOUT CONTRAST RIGHT    CLINICAL HISTORY:  52-year-old woman with right knee pain fall post injury 07/09/2023.    TECHNIQUE:  Axial T2 fat sat, sagittal T2 fat sat, sagittal PD, coronal PD fat sat, coronal T1, and thin slice axial T2 fat-sat meniscal non-contrast 1.5 T magnetic resonance imaging was performed through the right knee per routine protocol    COMPARISON:  None.  Correlation is made with right knee radiographs of 07/09/2023.    FINDINGS:  There is a moderate-size hemarthrosis without popliteal fossa cyst.  There is prominent marrow edema diffusely slow across the posterior medial and lateral tibial plateau and extending along the medial aspect of the medial tibial plateau.  There is a non-displaced fracture at the posterior medial tibial plateau and in additional moderately depressed impaction fracture along the posterior lateral tibial plateau, well demonstrated on the sagittal imaging.  There is additional moderate patchy marrow edema at the fibular head greater than neck with associated hairline oblique fracture at the fibular head.  The proximal tibiofibular joint remains normal.  There is much more focal and mild edema underlying the lateral  femoral sulcus without associated fracture.  The ACL is diffusely edematous and prominently thickened with poor definition, consistent with at least near full-thickness rupture.  There is no tibial translation.  The PCL is normal.  The medial meniscus and lateral meniscus are both intact and normal.  There is moderate edema along the proximal 2/3 of the superficial medial collateral ligament which is mildly thickened with intermediate signal along its proximal 3rd, consistent with grade 2 sprain.  There is also grade 2 sprain along the meniscofemoral and meniscotibial ligaments with focal thin marrow edema at the medial aspect of the medial femoral condyle overlying the body of the medial meniscus.  There is additional more focal edema with thickening and intermediate signal involving the proximal insertional fibular collateral ligament.  The posterolateral corner ligaments are normal.  The medial and lateral tendons are normal.  The quadriceps tendon and patellofemoral retinacular complexes are normal and there is no abnormality at quadriceps fat pad.  The patellar tendon and Hoffa's fat pad are normal.  There is mild diffuse anterior infrapatellar subcutaneous edema without hematoma or bursitis.  There is mild feathery muscle edema diffusely surrounding the proximal fibula consistent with low-grade strain and/or contusion.  No muscle atrophy.  Alignment at the knee is anatomic.  The patellofemoral and femorotibial articular cartilage is normal and there is no significant degenerative change.  Impression: Moderate size hemarthrosis with prominent osseous contusion and impaction fracturing along the posterior proximal tibia with focal articular surface depression along the posterior lateral tibial plateau.  Additional much more mild osseous contusion underlying the lateral femoral sulcus and the findings are consistent with pivot shift type osseous contusions.    At least near complete ACL rupture without associated  anterior tibial translation.  No meniscal tear.    Grade 2 sprain of the superficial and deep MCL and additional more mild grade 2 sprain at the proximal fibular collateral ligament.    Osseous contusion and hairline fracture at the fibular head without posterolateral corner ligament injury.    Low grade 1 sprain and/or contusion involving the muscles surrounding the proximal fibula.    Electronically signed by: Santiago Galan  Date:    07/22/2023  Time:    06:56    MRI reviewed showing ACL rupture.  Grade 2 MCL injury.  Grade 2 LCL injury as well.  Posterolateral corner was intact.  Cartilage is intact.  No meniscus tear.  Extensor mechanism is intact.        1. Rupture of anterior cruciate ligament of right knee, subsequent encounter                Plan:            Imaging and exam findings discussed.  She sustained an acute traumatic injury to her right knee.  MRI shows ACL tear with grade 2 MCL and LCL sprains.  No additional intra-articular pathology.  We again discussed options at length.  She has done very well with her rehab.  She has good motion and strength with no gross instability and is tolerating her activities.  She is out of her brace.  She is been working without issues.  Again she is pleased with her progress and would like to avoid surgery.  I think she is doing well from an exam standpoint today. Follow up in around 8 weeks as we continue to monitor her rehab and recovery. All of her questions were answered. She was happy and in agreement with the plan.

## 2023-11-30 ENCOUNTER — TELEPHONE (OUTPATIENT)
Dept: ORTHOPEDICS | Facility: CLINIC | Age: 53
End: 2023-11-30
Payer: COMMERCIAL

## 2024-01-19 ENCOUNTER — TELEPHONE (OUTPATIENT)
Dept: ORTHOPEDICS | Facility: CLINIC | Age: 54
End: 2024-01-19

## 2024-02-14 ENCOUNTER — PATIENT OUTREACH (OUTPATIENT)
Dept: ADMINISTRATIVE | Facility: HOSPITAL | Age: 54
End: 2024-02-14
Payer: COMMERCIAL

## 2024-02-14 NOTE — LETTER
"  This communication is flagged as high priority.        AUTHORIZATION FOR RELEASE OF   CONFIDENTIAL INFORMATION    Dear Staff Dr. MICHAEL Smith,    We are seeing Zandra Beck, date of birth 1970, in the clinic at [facility]. Eloise Newsome MD is the patient's PCP. Zandra Beck has an outstanding lab/procedure at the time we reviewed her chart. In order to help keep her health information updated, she has authorized us to request the following medical record(s):        (  )  MAMMOGRAM                                      (  xx)  COLONOSCOPY/PATH      (  )  PAP SMEAR                                          (  )  OUTSIDE LAB RESULTS     (  )  DEXA SCAN                                          (  )  EYE EXAM            (  )  FOOT EXAM                                          (  )  ENTIRE RECORD     (  )  OUTSIDE IMMUNIZATIONS                 (  )  _______________         Please fax records to [facility], Bienvenu-Oubre, Shauna, MD,  [phone]  Attn: Rosa Elena      If you have any questions, please contact Ramesh Carrasco" Valentina ,Care Coordinator @ [phone]    Patient Name: Zandra Beck  : 1970  Patient Phone #: [phone]     "

## 2024-02-28 ENCOUNTER — HOSPITAL ENCOUNTER (EMERGENCY)
Facility: HOSPITAL | Age: 54
Discharge: HOME | End: 2024-02-29
Attending: EMERGENCY MEDICINE
Payer: COMMERCIAL

## 2024-02-28 DIAGNOSIS — R07.9 CHEST PAIN: ICD-10-CM

## 2024-02-28 LAB
ALBUMIN SERPL-MCNC: 3.5 G/DL (ref 3.3–5.5)
ALP SERPL-CCNC: 75 U/L (ref 42–141)
BILIRUB SERPL-MCNC: 0.7 MG/DL (ref 0.2–1.6)
BUN SERPL-MCNC: 15 MG/DL (ref 7–22)
CALCIUM SERPL-MCNC: 9.1 MG/DL (ref 8–10.3)
CHLORIDE SERPL-SCNC: 109 MMOL/L (ref 98–108)
CREAT SERPL-MCNC: 1.1 MG/DL (ref 0.6–1.2)
GLUCOSE SERPL-MCNC: 128 MG/DL (ref 73–118)
HCT, POC: NORMAL
HGB, POC: NORMAL (ref 14–18)
MCH, POC: NORMAL
MCHC, POC: NORMAL
MCV, POC: NORMAL
MPV, POC: NORMAL
POC ALT (SGPT): 19 U/L (ref 10–47)
POC AST (SGOT): 24 U/L (ref 11–38)
POC CARDIAC TROPONIN I: 0.01 NG/ML (ref 0–0.08)
POC PLATELET COUNT: NORMAL
POC PTINR: 1.3 (ref 0.9–1.2)
POC PTWBT: 15.4 SEC (ref 9.7–14.3)
POC TCO2: 26 MMOL/L (ref 18–33)
POTASSIUM BLD-SCNC: 4.4 MMOL/L (ref 3.6–5.1)
PROTEIN, POC: 7 G/DL (ref 6.4–8.1)
RBC, POC: NORMAL
RDW, POC: NORMAL
SAMPLE: ABNORMAL
SAMPLE: NORMAL
SODIUM BLD-SCNC: 139 MMOL/L (ref 128–145)
WBC, POC: NORMAL

## 2024-02-28 PROCEDURE — 93005 ELECTROCARDIOGRAM TRACING: CPT | Mod: ER

## 2024-02-28 PROCEDURE — 80053 COMPREHEN METABOLIC PANEL: CPT | Mod: ER

## 2024-02-28 PROCEDURE — 99285 EMERGENCY DEPT VISIT HI MDM: CPT | Mod: 25,ER

## 2024-02-28 PROCEDURE — 25000003 PHARM REV CODE 250: Mod: ER | Performed by: EMERGENCY MEDICINE

## 2024-02-28 PROCEDURE — 93010 ELECTROCARDIOGRAM REPORT: CPT | Mod: 76,,, | Performed by: INTERNAL MEDICINE

## 2024-02-28 PROCEDURE — 85025 COMPLETE CBC W/AUTO DIFF WBC: CPT | Mod: ER

## 2024-02-28 PROCEDURE — 93010 ELECTROCARDIOGRAM REPORT: CPT | Mod: ,,, | Performed by: INTERNAL MEDICINE

## 2024-02-28 RX ORDER — ASPIRIN 325 MG
325 TABLET ORAL
Status: COMPLETED | OUTPATIENT
Start: 2024-02-28 | End: 2024-02-28

## 2024-02-28 RX ADMIN — ASPIRIN 325 MG ORAL TABLET 325 MG: 325 PILL ORAL at 10:02

## 2024-02-29 VITALS
OXYGEN SATURATION: 97 % | TEMPERATURE: 98 F | DIASTOLIC BLOOD PRESSURE: 85 MMHG | HEIGHT: 66 IN | BODY MASS INDEX: 36.96 KG/M2 | HEART RATE: 62 BPM | WEIGHT: 230 LBS | RESPIRATION RATE: 13 BRPM | SYSTOLIC BLOOD PRESSURE: 162 MMHG

## 2024-02-29 LAB
OHS QRS DURATION: 80 MS
OHS QRS DURATION: 80 MS
OHS QTC CALCULATION: 444 MS
OHS QTC CALCULATION: 479 MS

## 2024-02-29 PROCEDURE — 96374 THER/PROPH/DIAG INJ IV PUSH: CPT | Mod: ER

## 2024-02-29 PROCEDURE — 63600175 PHARM REV CODE 636 W HCPCS: Mod: ER | Performed by: EMERGENCY MEDICINE

## 2024-02-29 PROCEDURE — 25000003 PHARM REV CODE 250: Mod: ER | Performed by: EMERGENCY MEDICINE

## 2024-02-29 RX ORDER — NAPROXEN 500 MG/1
500 TABLET ORAL 2 TIMES DAILY WITH MEALS
Qty: 20 TABLET | Refills: 0 | Status: SHIPPED | OUTPATIENT
Start: 2024-02-29 | End: 2024-03-10

## 2024-02-29 RX ORDER — METHOCARBAMOL 750 MG/1
1500 TABLET, FILM COATED ORAL EVERY 6 HOURS
Qty: 24 TABLET | Refills: 0 | Status: SHIPPED | OUTPATIENT
Start: 2024-02-29 | End: 2024-03-03

## 2024-02-29 RX ORDER — METHOCARBAMOL 750 MG/1
1500 TABLET, FILM COATED ORAL
Status: COMPLETED | OUTPATIENT
Start: 2024-02-29 | End: 2024-02-29

## 2024-02-29 RX ORDER — KETOROLAC TROMETHAMINE 30 MG/ML
15 INJECTION, SOLUTION INTRAMUSCULAR; INTRAVENOUS
Status: COMPLETED | OUTPATIENT
Start: 2024-02-29 | End: 2024-02-29

## 2024-02-29 RX ADMIN — KETOROLAC TROMETHAMINE 15 MG: 30 INJECTION, SOLUTION INTRAMUSCULAR; INTRAVENOUS at 12:02

## 2024-02-29 RX ADMIN — METHOCARBAMOL 1500 MG: 750 TABLET ORAL at 12:02

## 2024-02-29 NOTE — ED PROVIDER NOTES
"Encounter Date: 2/28/2024    SCRIBE #1 NOTE: I, SINA Tyler, am scribing for, and in the presence of,  Param Beck MD. I have scribed the following portions of the note - Other sections scribed: HPI, ROS, PE.       History     Chief Complaint   Patient presents with    Arm Pain    CHEST DISCOMFORT     0PT C/O LEFT SHOULDER PAIN RADIATING DOWN ARM TO HAND AND TO LEFT AXILLA AND CHEST SINCE LAST NIGHT     Zandrasa SARAH Beck is a 53 y.o. female, with a history of HTN and seizures, who presents to the ED with pain in left arm which started last night. Pain radiates to left side of chest. She describes it as a "shooting, throbbing" sensation which is worse with arm movement. Pain started when pt was lying in bed after her shower ~2030 last night. She says pain was 10/10 during onset. 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 She denies any recent surgery or long travel, but reports she commutes 1.5 hr to work every day. She reports feeling a similar pain a long time ago, but she never received a diagnosis for what it could be. She denies any hx of cancer or MI. Denies any SOB, cough, neck pain, weakness in arm, rash, palpitations, nausea, vomiting, dizziness, or light-headedness. Denies tobacco use.    The history is provided by the patient. No  was used.     Review of patient's allergies indicates:  No Known Allergies  Past Medical History:   Diagnosis Date    ACL (anterior cruciate ligament) tear     right knee    Complete tear of MCL of knee, right, initial encounter     Seizure      Past Surgical History:   Procedure Laterality Date    MYRINGOTOMY W/ TUBES      TUBAL LIGATION       Family History   Problem Relation Age of Onset    COPD Mother     Hypertension Mother     Cancer Father     Heart disease Maternal Uncle      Social History     Tobacco Use    Smoking status: Never    Smokeless tobacco: Never   Substance Use Topics    Alcohol use: Yes     " Comment: soc    Drug use: Never     Review of Systems   Constitutional:  Negative for fever.   HENT:  Negative for trouble swallowing and voice change.    Respiratory:  Negative for cough and shortness of breath.    Cardiovascular:  Positive for chest pain. Negative for palpitations and leg swelling.   Gastrointestinal:  Negative for abdominal pain, nausea and vomiting.   Musculoskeletal:  Positive for myalgias. Negative for neck pain.   Skin:  Negative for rash.   Neurological:  Negative for dizziness, syncope, weakness and light-headedness.   All other systems reviewed and are negative.      Physical Exam     Initial Vitals [02/28/24 2148]   BP Pulse Resp Temp SpO2   (!) 152/95 73 20 97.8 °F (36.6 °C) 98 %      MAP       --         Physical Exam    Nursing note and vitals reviewed.  Constitutional: She appears well-developed and well-nourished. She is not diaphoretic. No distress.   HENT:   Head: Normocephalic and atraumatic.   Mouth/Throat: Oropharynx is clear and moist. No oropharyngeal exudate.   Eyes: EOM are normal. Pupils are equal, round, and reactive to light.   Neck: Neck supple.   Normal range of motion.  Cardiovascular:  Normal rate, regular rhythm and intact distal pulses.           No murmur heard.  Pulses:       Radial pulses are 2+ on the right side and 2+ on the left side.   Pulmonary/Chest: Effort normal and breath sounds normal. No accessory muscle usage or stridor. No tachypnea. No respiratory distress. She has no decreased breath sounds. She exhibits no tenderness.   Abdominal: Abdomen is soft. Bowel sounds are normal. There is no abdominal tenderness.   Musculoskeletal:         General: No tenderness or edema. Normal range of motion.      Cervical back: Normal range of motion and neck supple.      Right lower leg: No edema.      Left lower leg: No edema.      Comments: No left arm swelling, warmth, erythema, or bony tenderness.     Neurological: She is alert and oriented to person, place, and  time. She has normal strength. No cranial nerve deficit.   Skin: Skin is warm and dry. No rash noted. No erythema. No pallor.   Psychiatric: She has a normal mood and affect.         ED Course   Procedures  Labs Reviewed   ISTAT PROCEDURE - Abnormal; Notable for the following components:       Result Value    POC PTWBT 15.4 (*)     POC PTINR 1.3 (*)     All other components within normal limits   POCT CMP - Abnormal; Notable for the following components:    POC Chloride 109 (*)     POC Glucose 128 (*)     All other components within normal limits   TROPONIN ISTAT   POCT CBC   POCT CMP   POCT PROTIME-INR   POCT TROPONIN     EKG Readings: (Independently Interpreted)   Initial Reading: No STEMI. Rhythm: Normal Sinus Rhythm. Heart Rate: 70. Ectopy: No Ectopy. Conduction: Normal. ST Segments: Normal ST Segments. T Waves Flipped: III. Axis: Normal. Clinical Impression: Normal Sinus Rhythm     ECG Results              EKG 12-lead (Final result)        Collection Time Result Time QRS Duration [facility] QTC Calculation    02/28/24 22:35:28 02/29/24 15:45:32 80 479                     Final result by Interface, Lab In [facility] (02/29/24 15:45:38)                   Narrative:    Test Reason : R07.9,    Vent. Rate : 070 BPM     Atrial Rate : 070 BPM     P-R Int : 138 ms          QRS Dur : 080 ms      QT Int : 444 ms       P-R-T Axes : 073 012 018 degrees     QTc Int : 479 ms    Normal sinus rhythm  Normal ECG  When compared with ECG of 28-FEB-2024 21:47,  No significant change was found  Confirmed by Ari Pulido MD ([id number]) on 2/29/2024 3:45:30 PM    Referred By: [email or web address]   SELF           Confirmed By:Ari Pulido MD                                     EKG 12-lead (Final result)        Collection Time Result Time QRS Duration [facility] QTC Calculation    02/28/24 21:47:36 02/29/24 15:45:26 80 444                     Final result by Interface, Lab In [facility] (02/29/24 15:45:30)                   Narrative:    Test Reason : cp    Vent.  Rate : 066 BPM     Atrial Rate : 066 BPM     P-R Int : 130 ms          QRS Dur : 080 ms      QT Int : 424 ms       P-R-T Axes : 074 014 018 degrees     QTc Int : 444 ms    Normal sinus rhythm  Normal ECG  No previous ECGs available  Confirmed by Ari Pulido MD ([id number]) on 2/29/2024 3:45:24 PM    Referred By: [email or web address]   SELF           Confirmed By:Ari Pulido MD                                  Imaging Results              X-Ray Chest AP Portable (Final result)  Result time 02/28/24 23:07:45      Final result by Otilia Perez MD (02/28/24 23:07:45)                   Impression:      No acute intrathoracic abnormality detected.      Electronically signed by: Otilia Perez  Date:    02/28/2024  Time:    23:07               Narrative:    EXAMINATION:  AP PORTABLE CHEST    CLINICAL HISTORY:  Chest pain, unspecified    TECHNIQUE:  AP portable chest radiograph was submitted.    COMPARISON:  07/09/2023    FINDINGS:  AP portable chest radiograph demonstrates a cardiac silhouette within normal limits.  There is no focal consolidation, pneumothorax, or pleural effusion.                                       Medications   aspirin tablet 325 mg (325 mg Oral Given 2/28/24 2253)   methocarbamoL tablet 1,500 mg (1,500 mg Oral Given 2/29/24 0028)   ketorolac injection 15 mg (15 mg Intravenous Given 2/29/24 0027)     Medical Decision Making  Amount and/or Complexity of Data Reviewed  Labs: ordered.    Risk  OTC drugs.  Prescription drug management.            Scribe Attestation:   Scribe #1: I performed the above scribed service and the documentation accurately describes the services I performed. I attest to the accuracy of the note.                   Medical Decision Making:   Differential Diagnosis:   Costochondritis, pleurisy, pneumonia, pneumothorax, chest wall strain, rib fracture, pericarditis, myocarditis, cocaine-associated chest pain, GERD, esophageal spasm, anxiety, aortic dissection, mediastinitis,  others      Clinical Tests:   Lab Tests: Ordered and Reviewed  Radiological Study: Ordered and Reviewed  Medical Tests: Ordered and Reviewed  ED Management:  Test results, imaging results, outpatient management plan, outpatient PCP follow up and ED return precautions discussed with patient with understanding and agreement.              Labs Reviewed  Pertinent lab and imaging findings include:  White count 9.1, H&H of 11.3 and 32.9, platelets for wound, troponin 0.01, CMP alcohol except mildly elevated chloride 109, mild hyperglycemia 128, anion gap 4, chest x-ray negative for acute abnormality    Admission on 02/28/2024, Discharged on 02/29/2024   Component Date Value Ref Range Status    QRS Duration 02/28/2024 80  ms Final    [facility] QTC Calculation 02/28/2024 444  ms Final    QRS Duration 02/28/2024 80  ms Final    [facility] QTC Calculation 02/28/2024 479  ms Final    POC PTWBT 02/28/2024 15.4 (H)  9.7 - 14.3 sec Final    POC PTINR 02/28/2024 1.3 (H)  0.9 - 1.2 Final    Sample 02/28/2024 unknown   Final    Albumin, POC 02/28/2024 3.5  3.3 - 5.5 g/dL Final    Alkaline Phosphatase, POC 02/28/2024 75  42 - 141 U/L Final    ALT (SGPT), POC 02/28/2024 19  10 - 47 U/L Final    AST (SGOT), POC 02/28/2024 24  11 - 38 U/L Final    POC BUN 02/28/2024 15  7 - 22 mg/dL Final    Calcium, POC 02/28/2024 9.1  8.0 - 10.3 mg/dL Final    POC Chloride 02/28/2024 109 (H)  98 - 108 mmol/L Final    POC Creatinine 02/28/2024 1.1  0.6 - 1.2 mg/dL Final    POC Glucose 02/28/2024 128 (H)  73 - 118 mg/dL Final    POC Potassium 02/28/2024 4.4  3.6 - 5.1 mmol/L Final    POC Sodium 02/28/2024 139  128 - 145 mmol/L Final    Bilirubin, POC 02/28/2024 0.7  0.2 - 1.6 mg/dL Final    POC TCO2 02/28/2024 26  18 - 33 mmol/L Final    Protein, POC 02/28/2024 7.0  6.4 - 8.1 g/dL Final    POC Cardiac Troponin I 02/28/2024 0.01  0.00 - 0.08 ng/mL Final    Sample 02/28/2024 unknown   Final    Comment: A single negative troponin is insufficient to rule out myocardial  infarction.  The use of a serial sampling protocol is recommended practice. Correlate results with reference intervals established for methodology used. Point of care and core laboratory   troponin results are not interchangeable.          Imaging Reviewed    Imaging Results              X-Ray Chest AP Portable (Final result)  Result time 02/28/24 23:07:45      Final result by Otilia Perez MD (02/28/24 23:07:45)                   Impression:      No acute intrathoracic abnormality detected.      Electronically signed by: Otilia Perez  Date:    02/28/2024  Time:    23:07               Narrative:    EXAMINATION:  AP PORTABLE CHEST    CLINICAL HISTORY:  Chest pain, unspecified    TECHNIQUE:  AP portable chest radiograph was submitted.    COMPARISON:  07/09/2023    FINDINGS:  AP portable chest radiograph demonstrates a cardiac silhouette within normal limits.  There is no focal consolidation, pneumothorax, or pleural effusion.                                      Medications given in ED    Medications   aspirin tablet 325 mg (325 mg Oral Given 2/28/24 2253)   methocarbamoL tablet 1,500 mg (1,500 mg Oral Given 2/29/24 0028)   ketorolac injection 15 mg (15 mg Intravenous Given 2/29/24 0027)         Note was created using voice recognition software. Note may have occasional typographical errors that may not have been identified and edited despite good yolanda initial review prior to signing.    I, Param Beck MD, personally performed the services described in this documentation. All medical record entries made by the scribe were at my direction and in my presence.  I have reviewed the chart and agree that the record reflects my personal performance and is accurate and complete.      Clinical Impression:  Final diagnoses:  [R07.9] Chest pain          ED Disposition Condition    Discharge Stable          ED Prescriptions       Medication Sig Dispense Start Date End Date Auth. Provider    methocarbamoL (ROBAXIN)  750 MG Tab () Take 2 tablets (1,500 mg total) by mouth every 6 (six) hours. for 3 days 24 tablet 2024 3/3/2024 Param Beck MD    naproxen (NAPROSYN) 500 MG tablet Take 1 tablet (500 mg total) by mouth 2 (two) times daily with meals. for 10 days 20 tablet 2024 3/10/2024 Param Beck MD          Follow-up Information       Follow up With Specialties Details Why Contact Info    The nearest emergency department.  Go to  As needed, If symptoms worsen     Your PCP  Call  As needed, for ongoing care              Param Beck MD  24 0012

## 2024-03-27 ENCOUNTER — PATIENT OUTREACH (OUTPATIENT)
Dept: ADMINISTRATIVE | Facility: HOSPITAL | Age: 54
End: 2024-03-27
Payer: COMMERCIAL

## 2024-03-27 NOTE — PROGRESS NOTES
Health Maintenance Topic(s) Outreach Outcomes & Actions Taken:  Chart review pending call to the the patient.       Additional Notes:           Care Management, Digital Medicine, and/or Education Referrals

## 2024-03-27 NOTE — PROGRESS NOTES
Health Maintenance Topic(s) Outreach Outcomes & Actions Taken:  Call to patient no answer, left message requesting call back to update overdue Health Topics.    Breast Cancer Screening - Outreach Outcomes & Actions Taken  : Mammogram Order Placed and still pending ordered .    Cervical Cancer Screening - Outreach Outcomes & Actions Taken  : overdue, may want to be referred     Colorectal Cancer Screening - Outreach Outcomes & Actions Taken  : Need to follow up with patient may have had an [place] of state Colonoscopy , [facility] unable to reach patient, letter sent.    Lab(s) - Outreach Outcomes & Actions Taken  : Overdue need orders     Additional Notes:  Hx of multiple missed appointments.         Care Management, Digital Medicine, and/or Education Referrals